# Patient Record
Sex: FEMALE | NOT HISPANIC OR LATINO | URBAN - METROPOLITAN AREA
[De-identification: names, ages, dates, MRNs, and addresses within clinical notes are randomized per-mention and may not be internally consistent; named-entity substitution may affect disease eponyms.]

---

## 2023-07-18 ENCOUNTER — APPOINTMENT (RX ONLY)
Dept: URBAN - METROPOLITAN AREA CLINIC 41 | Facility: CLINIC | Age: 63
Setting detail: DERMATOLOGY
End: 2023-07-18

## 2023-07-18 DIAGNOSIS — Z41.9 ENCOUNTER FOR PROCEDURE FOR PURPOSES OTHER THAN REMEDYING HEALTH STATE, UNSPECIFIED: ICD-10-CM

## 2023-07-18 PROCEDURE — ? COSMETIC CONSULTATION: GENERAL

## 2023-07-18 PROCEDURE — ? COSMETIC CONSULTATION: HYDRAFACIAL

## 2023-08-08 ENCOUNTER — APPOINTMENT (RX ONLY)
Dept: URBAN - METROPOLITAN AREA CLINIC 41 | Facility: CLINIC | Age: 63
Setting detail: DERMATOLOGY
End: 2023-08-08

## 2023-08-08 DIAGNOSIS — Z41.9 ENCOUNTER FOR PROCEDURE FOR PURPOSES OTHER THAN REMEDYING HEALTH STATE, UNSPECIFIED: ICD-10-CM

## 2023-08-08 PROCEDURE — ? COSMETIC CONSULTATION: SKIN TIGHTENING

## 2023-08-08 PROCEDURE — ? HYDRAFACIAL

## 2023-08-08 PROCEDURE — ? COSMETIC CONSULTATION: PRODUCTS

## 2023-08-08 NOTE — PROCEDURE: HYDRAFACIAL
Comments: Patient said she didn't feel anything with the peel. There was mild erythema in the skin for the entire treatment and little to no excess heat. Manual extractions were performed using cotton tipped applicators. \\n\\nProducts Used: Simply Clean Cleanser, Interfuse Face and Neck, Advanced Correction Cream, and RE Sheer Physical SPF 50

## 2023-08-08 NOTE — PROCEDURE: HYDRAFACIAL
Price (Use Numbers Only, No Special Characters Or $): 175 Price (Use Numbers Only, No Special Characters Or $): 676